# Patient Record
Sex: FEMALE | Race: WHITE | NOT HISPANIC OR LATINO | Employment: OTHER | RURAL
[De-identification: names, ages, dates, MRNs, and addresses within clinical notes are randomized per-mention and may not be internally consistent; named-entity substitution may affect disease eponyms.]

---

## 2017-06-07 ENCOUNTER — OFFICE VISIT (OUTPATIENT)
Dept: CARDIOLOGY | Facility: CLINIC | Age: 72
End: 2017-06-07

## 2017-06-07 VITALS
WEIGHT: 245 LBS | DIASTOLIC BLOOD PRESSURE: 88 MMHG | HEART RATE: 52 BPM | HEIGHT: 63 IN | BODY MASS INDEX: 43.41 KG/M2 | SYSTOLIC BLOOD PRESSURE: 130 MMHG

## 2017-06-07 DIAGNOSIS — I49.3 PVC (PREMATURE VENTRICULAR CONTRACTION): ICD-10-CM

## 2017-06-07 DIAGNOSIS — G89.29 CHRONIC BILATERAL LOW BACK PAIN WITHOUT SCIATICA: ICD-10-CM

## 2017-06-07 DIAGNOSIS — I10 ESSENTIAL HYPERTENSION: Primary | ICD-10-CM

## 2017-06-07 DIAGNOSIS — J45.20 MILD INTERMITTENT ASTHMA WITHOUT COMPLICATION: ICD-10-CM

## 2017-06-07 DIAGNOSIS — E78.49 OTHER HYPERLIPIDEMIA: ICD-10-CM

## 2017-06-07 DIAGNOSIS — M54.50 CHRONIC BILATERAL LOW BACK PAIN WITHOUT SCIATICA: ICD-10-CM

## 2017-06-07 PROCEDURE — 93000 ELECTROCARDIOGRAM COMPLETE: CPT | Performed by: NURSE PRACTITIONER

## 2017-06-07 PROCEDURE — 99213 OFFICE O/P EST LOW 20 MIN: CPT | Performed by: NURSE PRACTITIONER

## 2017-06-07 RX ORDER — SIMVASTATIN 10 MG
10 TABLET ORAL NIGHTLY
COMMUNITY
End: 2018-12-12 | Stop reason: SDUPTHER

## 2017-06-07 RX ORDER — OXYCODONE HYDROCHLORIDE 30 MG/1
30 TABLET, FILM COATED, EXTENDED RELEASE ORAL 2 TIMES DAILY
COMMUNITY

## 2017-06-07 RX ORDER — HYDRALAZINE HYDROCHLORIDE 10 MG/1
10 TABLET, FILM COATED ORAL DAILY
COMMUNITY
End: 2017-12-06 | Stop reason: SDUPTHER

## 2017-06-07 RX ORDER — DILTIAZEM HYDROCHLORIDE 120 MG/1
120 CAPSULE, EXTENDED RELEASE ORAL DAILY
COMMUNITY
End: 2018-12-12 | Stop reason: SDUPTHER

## 2017-06-07 RX ORDER — CHLORTHALIDONE 25 MG/1
25 TABLET ORAL DAILY
COMMUNITY
End: 2018-06-20 | Stop reason: ALTCHOICE

## 2017-06-07 RX ORDER — HYDROCHLOROTHIAZIDE 25 MG/1
25 TABLET ORAL DAILY
COMMUNITY
End: 2017-06-07 | Stop reason: HOSPADM

## 2017-06-07 NOTE — PROGRESS NOTES
Chief Complaint   Patient presents with   • Follow-up     She states B/P is only elevated with pain. Is to have labs today at the hospital. PCP writes refills on medication.   • Shortness of Breath     Relates to Asthma.       Cardiac Complaints  none      Subjective   Krista Mann is a 72 y.o. female with a history of HTN, hypercholesteremia, and PVCs, for which she takes tambocor for. Most recent stress in 2014 was negative for ischemia and showed good LV function. She returns today for follow up reporting some issues with hypertension. At last visit, she stated her blood pressure had been very high at home, almost 220 systolic, from her back pain.  Her cardizem dose was lowered last visit because of bradycardia and she was stared on chlorthalidone and hydralazine and HCTZ was stopped. Echo was advised at last visit to rule out any LVH, pulmonary HTN, or valvular concerns.  Echo showed a good LV function, no WMA, and no valvular concerns. She returns today for follow up and does state some shortness of breath if she over does it but is unsure if this is related to her asthma.  She does deny any palpitations or chest pain. Her main concern is in regard to stress in her life from caring for her  who is currently battling dementia. She states that her labs are monitored with PCP and will have blood work today.  No refills are needed today as they are done with PCP.  There is some confusion with her medication as she is on both HCTZ and chlorthalidone.    Cardiac History  Past Surgical History:   Procedure Laterality Date   • CARDIOVASCULAR STRESS TEST  07/17/2014    L. Myoview- negative for ischemia, PVCs noted, Tambocor increased 100 bid    • CONVERTED (HISTORICAL) HOLTER  08/18/2014    Holter- baseline NSR, ave 76 bpm, 16% PVCs, no V-tach, no SVT, no pauses, Cardizem added    • ECHO - CONVERTED  07/10/2014     Echo- E F60-65%, RVSP 30 mmHg, mild MR    • ECHO - CONVERTED  12/06/2016    EF 60%, mild MR, RSVP  "24       Current Outpatient Prescriptions   Medication Sig Dispense Refill   • chlorthalidone (HYGROTON) 25 MG tablet Take 25 mg by mouth Daily.     • diltiaZEM (TIAZAC) 120 MG 24 hr capsule Take 120 mg by mouth Daily.     • flecainide (TAMBOCOR) 100 MG tablet Take 100 mg by mouth 2 (Two) Times a Day.     • hydrALAZINE (APRESOLINE) 10 MG tablet Take 10 mg by mouth Daily.     • moexipril-hydrochlorothiazide (UNIRETIC) 15-25 MG per tablet Take 1 tablet by mouth Daily.     • OxyCODONE HCl ER (oxyCONTIN) 30 MG tablet extended-release 12 hour Take 30 mg by mouth 3 (Three) Times a Day.     • promethazine (PHENERGAN) 12.5 MG tablet Take 12.5 mg by mouth As Needed for nausea or vomiting.     • rOPINIRole (REQUIP) 3 MG tablet Take 3 mg by mouth Every Night.     • simvastatin (ZOCOR) 10 MG tablet Take 10 mg by mouth Every Night.       No current facility-administered medications for this visit.        Other and Sulfa antibiotics    Past Medical History:   Diagnosis Date   • Asthma    • Chronic back pain     followed by dr. 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 s/p surgery per Dr. Bailon and Dr. Zuluaga. Had colonstomy for four months.   • H/O total knee replacement      right knee   • H/O: hysterectomy     partial   • History of blood transfusion      several   • History of D&C    • History of nasal surgery     right side CA   • Hyperlipidemia    • Hypertension    • Mass     Also in May 2013 dx with large softball mass in stomach- non CA.   • Nerves      \"burnt\" in back at pain clinic and injections.   • RLS (restless legs syndrome)        Social History     Social History   • Marital status:      Spouse name: N/A   • Number of children: N/A   • Years of education: N/A     Occupational History   • Not on file.     Social History Main Topics   • Smoking status: Former " "Smoker     Quit date: 2000   • Smokeless tobacco: Never Used      Comment: Quit in 1998- started at age 25.    • Alcohol use No   • Drug use: No   • Sexual activity: Not on file     Other Topics Concern   • Not on file     Social History Narrative       Family History   Problem Relation Age of Onset   • Heart attack Father        Review of Systems   Constitutional: Negative.    HENT: Negative.    Respiratory: Positive for shortness of breath.    Cardiovascular: Negative.    Gastrointestinal: Negative.    Endocrine: Negative.    Skin: Negative.    Neurological: Negative.    Psychiatric/Behavioral: Negative.        DiabetesNo  Thyroidnormal    Objective     /88  Pulse 52  Ht 63\" (160 cm)  Wt 245 lb (111 kg)  BMI 43.4 kg/m2    Physical Exam   Constitutional: She is oriented to person, place, and time. She appears well-developed and well-nourished.   HENT:   Head: Normocephalic and atraumatic.   Eyes: EOM are normal. Pupils are equal, round, and reactive to light.   Neck: Normal range of motion. Neck supple.   Cardiovascular: Normal rate.  A regularly irregular rhythm present.   Murmur heard.  Pulmonary/Chest: Effort normal and breath sounds normal.   Abdominal: Soft.   Musculoskeletal: Normal range of motion.   Walking with a walker   Neurological: She is alert and oriented to person, place, and time.   Skin: Skin is warm and dry.         ECG 12 Lead  Date/Time: 6/7/2017 11:19 AM  Performed by: SADI PETERSEN  Authorized by: SADI PETERSEN   Rhythm: sinus rhythm  Ectopy: frequent PVCs  BPM: 77              Assessment/Plan     HR and BP are both stable today.  EKG done today for PVC and tambocor therapy shows a NSR with trigeminal PVC, she is completely asymptomatic and does not feel the palpitations.  She is on a great deal of diuretic therapy and not sure how she ended up on both HCTZ and chlorthalidone therapy.  She will be advised to d/c the HCTZ today and return next week for EKG. Labs will be checked " today by you, patient advised her potassium may be running low from the diuretic therapy she is on, she was encouraged she may need replacement.  No refills are needed at present.  No new cardiac workup advised as no new concerns voiced, most recent echo went over with patient.  Good cardiac diet and activity as tolerated advised. 6 month follow up advised or sooner if needed.  We will have her return in one week for EKG to reassess PVC.      Problems Addressed this Visit        Cardiovascular and Mediastinum    HTN (hypertension) - Primary    Relevant Medications    diltiaZEM (TIAZAC) 120 MG 24 hr capsule    chlorthalidone (HYGROTON) 25 MG tablet    hydrALAZINE (APRESOLINE) 10 MG tablet    Hyperlipemia    Relevant Medications    simvastatin (ZOCOR) 10 MG tablet       Respiratory    Asthma       Nervous and Auditory    Chronic back pain      Other Visit Diagnoses     PVC (premature ventricular contraction)        Relevant Medications    diltiaZEM (TIAZAC) 120 MG 24 hr capsule    Other Relevant Orders    ECG 12 Lead                  Electronically signed by ADAN Escobar June 7, 2017 3:09 PM

## 2017-06-14 ENCOUNTER — CLINICAL SUPPORT (OUTPATIENT)
Dept: CARDIOLOGY | Facility: CLINIC | Age: 72
End: 2017-06-14

## 2017-06-14 DIAGNOSIS — I49.3 PVC (PREMATURE VENTRICULAR CONTRACTION): Primary | ICD-10-CM

## 2017-06-14 PROCEDURE — 93000 ELECTROCARDIOGRAM COMPLETE: CPT | Performed by: INTERNAL MEDICINE

## 2017-06-14 NOTE — PROGRESS NOTES
Procedure     ECG 12 Lead  Date/Time: 6/14/2017 10:03 AM  Performed by: MELVA GILLESPIE  Authorized by: MELVA GILLESPIE   Comparison: compared with previous ECG from 6/7/2017  Comparison to previous ECG: No PVC's  Rhythm: sinus rhythm  Rate: normal  QRS axis: normal  Clinical impression: normal ECG

## 2017-12-06 ENCOUNTER — OFFICE VISIT (OUTPATIENT)
Dept: CARDIOLOGY | Facility: CLINIC | Age: 72
End: 2017-12-06

## 2017-12-06 VITALS
HEIGHT: 63 IN | DIASTOLIC BLOOD PRESSURE: 82 MMHG | BODY MASS INDEX: 39.34 KG/M2 | WEIGHT: 222 LBS | SYSTOLIC BLOOD PRESSURE: 150 MMHG | HEART RATE: 76 BPM

## 2017-12-06 DIAGNOSIS — I10 ESSENTIAL HYPERTENSION: ICD-10-CM

## 2017-12-06 DIAGNOSIS — E78.49 OTHER HYPERLIPIDEMIA: ICD-10-CM

## 2017-12-06 DIAGNOSIS — I49.3 PVC (PREMATURE VENTRICULAR CONTRACTION): Primary | ICD-10-CM

## 2017-12-06 DIAGNOSIS — R06.02 SHORTNESS OF BREATH: ICD-10-CM

## 2017-12-06 DIAGNOSIS — G89.29 CHRONIC BILATERAL LOW BACK PAIN WITHOUT SCIATICA: ICD-10-CM

## 2017-12-06 DIAGNOSIS — M54.50 CHRONIC BILATERAL LOW BACK PAIN WITHOUT SCIATICA: ICD-10-CM

## 2017-12-06 PROCEDURE — 99214 OFFICE O/P EST MOD 30 MIN: CPT | Performed by: NURSE PRACTITIONER

## 2017-12-06 PROCEDURE — 93000 ELECTROCARDIOGRAM COMPLETE: CPT | Performed by: NURSE PRACTITIONER

## 2017-12-06 RX ORDER — HYDRALAZINE HYDROCHLORIDE 10 MG/1
TABLET, FILM COATED ORAL
Qty: 60 TABLET | Refills: 8 | Status: SHIPPED | OUTPATIENT
Start: 2017-12-06 | End: 2018-06-20 | Stop reason: SDUPTHER

## 2017-12-06 NOTE — PROGRESS NOTES
Chief Complaint   Patient presents with   • Follow-up     For cardiac management. PCP writes refills on medication, reports last labs 3 months ago per PCP. She reports with pain B/P has been elevated. Patient verbalized current medication.   • Shortness of Breath     Relates to Asthma.       Cardiac Complaints  none      Subjective   Krista Mann is a 72 y.o. female with HTN, hypercholesteremia, and PVCs, for which she takes tambocor. Most recent stress in 2014 was negative for ischemia and showed good LV function. She returns today for follow up reporting some issues with hypertension. At last visit, she stated her blood pressure had been very high at home, almost 220 systolic, from her back pain.  Her cardizem dose was lowered last visit because of bradycardia and she was stared on chlorthalidone, hydralazine, and HCTZ was stopped. Echo was advised at last visit to rule out any LVH, pulmonary HTN, or valvular concerns.  Echo showed good LV function, no WMA, and no valvular concerns. At last visit, she reported use of both HCTZ and clorthalidone therapy.  She was advised to check medication regimen with PCP.  EKG done at that visit also showed frequent PVCs and repeat EKG was scheduled for the next week after stopping HCTZ and no PVCs were reported.  She returns today for follow up and denies any new cardiac concerns.  She does have some shortness of breath but attributes to her asthma.  She denies any chest pain or palpitations.  Labs he reports with PCP, she says most recent were done about 3 months ago, no copy available.  No refills needed as she report with PCP.  She continues to have elevated blood pressure when she is hurting but states most often well controlled.        Cardiac History  Past Surgical History:   Procedure Laterality Date   • CARDIOVASCULAR STRESS TEST  07/17/2014    L. Myoview- negative for ischemia, PVCs noted, Tambocor increased 100 bid    • CONVERTED (HISTORICAL) HOLTER  08/18/2014     "Holter- baseline NSR, ave 76 bpm, 16% PVCs, no V-tach, no SVT, no pauses, Cardizem added    • ECHO - CONVERTED  07/10/2014     Echo- E F60-65%, RVSP 30 mmHg, mild MR    • ECHO - CONVERTED  12/06/2016    EF 60%, mild MR, RSVP 24       Current Outpatient Prescriptions   Medication Sig Dispense Refill   • chlorthalidone (HYGROTON) 25 MG tablet Take 25 mg by mouth Daily.     • diltiaZEM (TIAZAC) 120 MG 24 hr capsule Take 120 mg by mouth Daily.     • flecainide (TAMBOCOR) 100 MG tablet Take 100 mg by mouth 2 (Two) Times a Day.     • hydrALAZINE (APRESOLINE) 10 MG tablet 1 tablet in AM and 1/2 tablet PM 60 tablet 8   • moexipril-hydrochlorothiazide (UNIRETIC) 15-25 MG per tablet Take 1 tablet by mouth Daily.     • OxyCODONE HCl ER (oxyCONTIN) 30 MG tablet extended-release 12 hour Take 30 mg by mouth 3 (Three) Times a Day.     • promethazine (PHENERGAN) 12.5 MG tablet Take 12.5 mg by mouth As Needed for nausea or vomiting.     • rOPINIRole (REQUIP) 3 MG tablet Take 3 mg by mouth Every Night.     • simvastatin (ZOCOR) 10 MG tablet Take 10 mg by mouth Every Night.       No current facility-administered medications for this visit.        Other and Sulfa antibiotics    Past Medical History:   Diagnosis Date   • Asthma    • Chronic back pain     followed by dr. 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 s/p surgery per Dr. Bailon and Dr. Zuluaga. Had colonstomy for four months.   • H/O total knee replacement      right knee   • H/O: hysterectomy     partial   • History of blood transfusion      several   • History of D&C    • History of nasal surgery     right side CA   • Hyperlipidemia    • Hypertension    • Mass     Also in May 2013 dx with large softball mass in stomach- non CA.   • Nerves      \"burnt\" in back at pain clinic and injections.   • RLS (restless legs syndrome)  " "      Social History     Social History   • Marital status:      Spouse name: N/A   • Number of children: N/A   • Years of education: N/A     Occupational History   • Not on file.     Social History Main Topics   • Smoking status: Former Smoker     Quit date: 2000   • Smokeless tobacco: Never Used      Comment: Quit in 1998- started at age 25.    • Alcohol use No   • Drug use: No   • Sexual activity: Not on file     Other Topics Concern   • Not on file     Social History Narrative       Family History   Problem Relation Age of Onset   • Heart attack Father        Review of Systems   Constitution: Negative for weakness and malaise/fatigue.   Cardiovascular: Negative for chest pain, dyspnea on exertion, near-syncope and palpitations.   Respiratory: Positive for shortness of breath. Negative for wheezing.    Musculoskeletal: Positive for back pain and joint pain.   Gastrointestinal: Negative for anorexia, heartburn and nausea.   Genitourinary: Negative for dysuria, hesitancy and nocturia.   Neurological: Negative for dizziness, light-headedness and loss of balance.   Psychiatric/Behavioral: Negative for altered mental status and depression.       DiabetesNo  Thyroidnormal    Objective     /82 (BP Location: Right arm)  Pulse 76  Ht 160 cm (62.99\")  Wt 101 kg (222 lb)  BMI 39.34 kg/m2    Physical Exam   Constitutional: She is oriented to person, place, and time. She appears well-developed and well-nourished.   HENT:   Head: Normocephalic and atraumatic.   Eyes: EOM are normal. Pupils are equal, round, and reactive to light.   Neck: Normal range of motion. Neck supple.   Cardiovascular: Normal rate and regular rhythm.    Murmur heard.  Pulmonary/Chest: Effort normal and breath sounds normal.   Abdominal: Soft.   Musculoskeletal: Normal range of motion.   Walking with assist from walker   Neurological: She is alert and oriented to person, place, and time.   Skin: Skin is warm and dry.   Psychiatric: She " has a normal mood and affect. Her behavior is normal.         ECG 12 Lead  Date/Time: 12/6/2017 12:17 PM  Performed by: SADI PETERSEN  Authorized by: SADI PETERSEN   Rhythm: sinus rhythm  Clinical impression: normal ECG            Assessment/Plan     HR is stable today.  EKG done today for PVCs and tambocor therapy shows NSR with normal QT.  No changes to tambocor regimen will be advised.  BP is slightly elevated at 150/80, but she admits to a great deal of back pain today.  I advised patient to add an extra 5mg dose of apresoline at night for better blood pressure control with limited sodium intake adivsed.  Patient encouraged she may add an extra half of apresoline if needed at night.  No new cardiac workup advised today as no new concerns are voiced.  Labs are done with your office, could we get next copy for our records?  No refills needed as they are filled with your office.  6 month follow up scheduled or sooner if needed.        Problems Addressed this Visit        Cardiovascular and Mediastinum    HTN (hypertension)    Relevant Medications    hydrALAZINE (APRESOLINE) 10 MG tablet    Hyperlipemia      Other Visit Diagnoses     PVC (premature ventricular contraction)    -  Primary    Relevant Orders    ECG 12 Lead    Chronic bilateral low back pain without sciatica                      Electronically signed by ADAN Escobar December 6, 2017 5:14 PM

## 2018-06-01 NOTE — PROGRESS NOTES
Chief Complaint   Patient presents with   • Follow-up     For cardiac management. Patient is not on aspirin. Reports she is to have lab work done soon per PCP. Reports she does have shortness of breath, but relates to asthma.    • Med Refill     Needs refills on cardiac medications. 30 day supplys to [facility].       Cardiac Complaints  chest pressure/discomfort      Subjective   Krista Mann is a 73 y.o. female with HTN, hypercholesteremia, and PVCs, for which she takes tambocor. Most recent stress in 2014 was negative for ischemia and showed good LV function. She returns today for follow up reporting some issues with hypertension. At last visit, she stated her blood pressure had been very high at home, almost 220 systolic, from her back pain.  Her cardizem dose was lowered last visit because of bradycardia and she was stared on chlorthalidone, hydralazine, and HCTZ was stopped. Echo was advised at last visit to rule out any LVH, pulmonary HTN, or valvular concerns.  Echo showed good LV function, no WMA, and no valvular concerns. At last visit, she reported use of both HCTZ and clorthalidone therapy.  She was advised to check medication regimen with PCP.  EKG done at that visit also showed frequent PVCs and repeat EKG was scheduled for the next week after stopping HCTZ and no PVCs were reported. Apresoline increased for better blood pressure control after last visit.  She returns today for follow up and denies any new cardiac concerns.  She does have shortness of breath but admits it is no worse than prior, she admits to her asthma.  She does continue to have back pain and has some elevated blood pressure but thinks it pain related and for the most part it has been well controlled with apresoline increase.  Refills of apresoline requested.  Her main concern is from anxiety from dealing from her  and his dementia.          Cardiac History  Past Surgical History:   Procedure Laterality Date    • CARDIOVASCULAR STRESS TEST  07/17/2014    L. Myoview- negative for ischemia, PVCs noted, Tambocor increased 100 bid    • CONVERTED (HISTORICAL) HOLTER  08/18/2014    Holter- baseline NSR, ave 76 bpm, 16% PVCs, no V-tach, no SVT, no pauses, Cardizem added    • ECHO - CONVERTED  07/10/2014     Echo- E F60-65%, RVSP 30 mmHg, mild MR    • ECHO - CONVERTED  12/06/2016    EF 60%, mild MR, RSVP 24       Current Outpatient Prescriptions   Medication Sig Dispense Refill   • diltiaZEM (TIAZAC) 120 MG 24 hr capsule Take 120 mg by mouth Daily.     • flecainide (TAMBOCOR) 100 MG tablet Take 100 mg by mouth 2 (Two) Times a Day.     • gabapentin (NEURONTIN) 800 MG tablet Take 800 mg by mouth 3 (Three) Times a Day.     • hydrALAZINE (APRESOLINE) 10 MG tablet 1 tablet in AM and 1/2 tablet PM 60 tablet 11   • moexipril-hydrochlorothiazide (UNIRETIC) 15-25 MG per tablet Take 1 tablet by mouth Daily.     • OxyCODONE HCl ER (oxyCONTIN) 30 MG tablet extended-release 12 hour Take 30 mg by mouth 3 (Three) Times a Day.     • promethazine (PHENERGAN) 12.5 MG tablet Take 12.5 mg by mouth As Needed for nausea or vomiting.     • rOPINIRole (REQUIP) 3 MG tablet Take 3 mg by mouth Every Night.     • simvastatin (ZOCOR) 10 MG tablet Take 10 mg by mouth Every Night.       No current facility-administered medications for this visit.        Other and Sulfa antibiotics    Past Medical History:   Diagnosis Date   • Asthma    • Chronic back pain     followed by dr. 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 s/p surgery per Dr. Bailon and Dr. Zuluaga. Had colonstomy for four months.   • H/O total knee replacement      right knee   • H/O: hysterectomy     partial   • History of blood transfusion      several   • History of D&C    • History of nasal surgery     right side CA   • Hyperlipidemia    •  "Hypertension    • Mass     Also in May 2013 dx with large softball mass in stomach- non CA.   • Nerves      \"burnt\" in back at pain clinic and injections.   • RLS (restless legs syndrome)        Social History     Social History   • Marital status:      Spouse name: N/A   • Number of children: N/A   • Years of education: N/A     Occupational History   • Not on file.     Social History Main Topics   • Smoking status: Former Smoker     Quit date: 2000   • Smokeless tobacco: Never Used      Comment: Quit in 1998- started at age 25.    • Alcohol use No   • Drug use: No   • Sexual activity: Not on file     Other Topics Concern   • Not on file     Social History Narrative   • No narrative on file       Family History   Problem Relation Age of Onset   • Heart attack Father        Review of Systems   Constitution: Negative for weakness and malaise/fatigue.   Cardiovascular: Positive for dyspnea on exertion. Negative for chest pain, irregular heartbeat, leg swelling, orthopnea, palpitations and syncope.   Respiratory: Positive for shortness of breath. Negative for cough and wheezing.    Musculoskeletal: Positive for back pain and joint pain.   Gastrointestinal: Negative for anorexia, heartburn and nausea.   Genitourinary: Negative for dysuria, hematuria, hesitancy and nocturia.   Neurological: Negative for dizziness, light-headedness and loss of balance.   Psychiatric/Behavioral: Negative for depression and memory loss. The patient is not nervous/anxious.            Objective     /90 (BP Location: Left arm)   Pulse 71   Ht 160 cm (62.99\")   Wt 108 kg (238 lb)   BMI 42.17 kg/m²     Physical Exam   Constitutional: She is oriented to person, place, and time. She appears well-developed and well-nourished.   HENT:   Head: Normocephalic and atraumatic.   Eyes: EOM are normal. Pupils are equal, round, and reactive to light.   Neck: Normal range of motion. Neck supple.   Cardiovascular: Normal rate and regular " rhythm.    Murmur heard.  Pulmonary/Chest: Effort normal and breath sounds normal.   Abdominal: Soft.   Musculoskeletal: Normal range of motion.   Neurological: She is alert and oriented to person, place, and time.   Skin: Skin is warm and dry.   Psychiatric: She has a normal mood and affect. Her behavior is normal.         ECG 12 Lead  Date/Time: 6/20/2018 12:05 PM  Performed by: SADI PETERSEN  Authorized by: SADI PETERSEN   Rhythm: sinus rhythm  BPM: 71  Clinical impression: normal ECG            Assessment/Plan     HR is stable today.  BP is elevated at 150/90, she thinks it is from back pain.  Patient encouraged if needed to take an extra half of apresoline as needed for better blood pressure control.  EKG done today for PVC and tambocor therapy shows a NSR with normal QT, current advised.  For lipid management, she continues on statin therapy and has tolerated well.  Labs with your office, could we have next for our review?  Refills of apresoline sent per request. We discussed her stress in detail from caring for her demented  and she does report getting respite care.  Weight is up quite a bit from last visit but she admits to late night snacking as she does not sleep.  We discussed limiting caloric intake, carbs, and starches as BMI high at 42.  She does continue to follow with pain management in regards to back pain. Refills of cardiac meds sent per request.  6 month follow up advised or sooner if needed.        Problems Addressed this Visit        Cardiovascular and Mediastinum    HTN (hypertension)    Relevant Medications    hydrALAZINE (APRESOLINE) 10 MG tablet    Hyperlipemia       Respiratory    Asthma       Nervous and Auditory    Chronic back pain      Other Visit Diagnoses     PVC (premature ventricular contraction)    -  Primary    Relevant Orders    ECG 12 Lead    Shortness of breath        Class 3 obesity due to excess calories with serious comorbidity and body mass index (BMI) of 40.0  to 44.9 in adult              Patient's Body mass index is 42.17 kg/m². BMI is above normal parameters. Recommendations include: nutrition counseling.          Electronically signed by ADAN Escobar June 20, 2018 4:48 PM

## 2018-06-20 ENCOUNTER — OFFICE VISIT (OUTPATIENT)
Dept: CARDIOLOGY | Facility: CLINIC | Age: 73
End: 2018-06-20

## 2018-06-20 VITALS
BODY MASS INDEX: 42.17 KG/M2 | SYSTOLIC BLOOD PRESSURE: 150 MMHG | HEIGHT: 63 IN | DIASTOLIC BLOOD PRESSURE: 90 MMHG | WEIGHT: 238 LBS | HEART RATE: 71 BPM

## 2018-06-20 DIAGNOSIS — J45.20 MILD INTERMITTENT ASTHMA WITHOUT COMPLICATION: ICD-10-CM

## 2018-06-20 DIAGNOSIS — G89.29 CHRONIC BILATERAL LOW BACK PAIN WITHOUT SCIATICA: ICD-10-CM

## 2018-06-20 DIAGNOSIS — I49.3 PVC (PREMATURE VENTRICULAR CONTRACTION): Primary | ICD-10-CM

## 2018-06-20 DIAGNOSIS — R06.02 SHORTNESS OF BREATH: ICD-10-CM

## 2018-06-20 DIAGNOSIS — IMO0001 CLASS 3 OBESITY DUE TO EXCESS CALORIES WITH SERIOUS COMORBIDITY AND BODY MASS INDEX (BMI) OF 40.0 TO 44.9 IN ADULT: ICD-10-CM

## 2018-06-20 DIAGNOSIS — I10 ESSENTIAL HYPERTENSION: ICD-10-CM

## 2018-06-20 DIAGNOSIS — M54.50 CHRONIC BILATERAL LOW BACK PAIN WITHOUT SCIATICA: ICD-10-CM

## 2018-06-20 DIAGNOSIS — E78.00 PURE HYPERCHOLESTEROLEMIA: ICD-10-CM

## 2018-06-20 PROCEDURE — 99214 OFFICE O/P EST MOD 30 MIN: CPT | Performed by: NURSE PRACTITIONER

## 2018-06-20 PROCEDURE — 93000 ELECTROCARDIOGRAM COMPLETE: CPT | Performed by: NURSE PRACTITIONER

## 2018-06-20 RX ORDER — GABAPENTIN 800 MG/1
800 TABLET ORAL 3 TIMES DAILY
COMMUNITY
End: 2019-06-19 | Stop reason: ALTCHOICE

## 2018-06-20 RX ORDER — HYDRALAZINE HYDROCHLORIDE 10 MG/1
TABLET, FILM COATED ORAL
Qty: 60 TABLET | Refills: 11 | Status: SHIPPED | OUTPATIENT
Start: 2018-06-20 | End: 2018-12-12 | Stop reason: SDUPTHER

## 2018-12-07 NOTE — PROGRESS NOTES
No chief complaint on file.      Cardiac Complaints  {NT symptoms.:65580}      Subjective   Krista Mann is a 73 y.o. female with HTN, hypercholesteremia, and PVCs, for which she takes tambocor. Most recent stress in 2014 was negative for ischemia and showed good LV function. She returns today for follow up reporting some issues with hypertension. At last visit, she stated her blood pressure had been very high at home, almost 220 systolic, from her back pain.  Her cardizem dose was lowered last visit because of bradycardia and she was stared on chlorthalidone, hydralazine, and HCTZ was stopped. Echo was advised at last visit to rule out any LVH, pulmonary HTN, or valvular concerns.  Echo showed good LV function, no WMA, and no valvular concerns. At last visit, she reported use of both HCTZ and clorthalidone therapy. She was advised to check medication regimen with PCP.  EKG done at that visit also showed frequent PVCs and repeat EKG was scheduled for the next week after stopping HCTZ and no PVCs were reported. In 2017, apresoline increased for better blood pressure control after last visit.                  Cardiac History  Past Surgical History:   Procedure Laterality Date   • CARDIOVASCULAR STRESS TEST  07/17/2014    L. Myoview- negative for ischemia, PVCs noted, Tambocor increased 100 bid    • CONVERTED (HISTORICAL) HOLTER  08/18/2014    Holter- baseline NSR, ave 76 bpm, 16% PVCs, no V-tach, no SVT, no pauses, Cardizem added    • ECHO - CONVERTED  07/10/2014     Echo- E F60-65%, RVSP 30 mmHg, mild MR    • ECHO - CONVERTED  12/06/2016    EF 60%, mild MR, RSVP 24       Current Outpatient Medications   Medication Sig Dispense Refill   • diltiaZEM (TIAZAC) 120 MG 24 hr capsule Take 120 mg by mouth Daily.     • flecainide (TAMBOCOR) 100 MG tablet Take 100 mg by mouth 2 (Two) Times a Day.     • gabapentin (NEURONTIN) 800 MG tablet Take 800 mg by mouth 3 (Three) Times a Day.     • hydrALAZINE (APRESOLINE) 10 MG tablet  "1 tablet in AM and 1/2 tablet PM 60 tablet 11   • moexipril-hydrochlorothiazide (UNIRETIC) 15-25 MG per tablet Take 1 tablet by mouth Daily.     • OxyCODONE HCl ER (oxyCONTIN) 30 MG tablet extended-release 12 hour Take 30 mg by mouth 3 (Three) Times a Day.     • promethazine (PHENERGAN) 12.5 MG tablet Take 12.5 mg by mouth As Needed for nausea or vomiting.     • rOPINIRole (REQUIP) 3 MG tablet Take 3 mg by mouth Every Night.     • simvastatin (ZOCOR) 10 MG tablet Take 10 mg by mouth Every Night.       No current facility-administered medications for this visit.        Other and Sulfa antibiotics    Past Medical History:   Diagnosis Date   • Asthma    • Chronic back pain     followed by dr. 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 s/p surgery per Dr. Bailon and Dr. Zuluaga. Had colonstomy for four months.   • H/O total knee replacement      right knee   • H/O: hysterectomy     partial   • History of blood transfusion      several   • History of D&C    • History of nasal surgery     right side CA   • Hyperlipidemia    • Hypertension    • Mass     Also in May 2013 dx with large softball mass in stomach- non CA.   • Nerves      \"burnt\" in back at pain clinic and injections.   • RLS (restless legs syndrome)        Social History     Socioeconomic History   • Marital status:      Spouse name: Not on file   • Number of children: Not on file   • Years of education: Not on file   • Highest education level: Not on file   Social Needs   • Financial resource strain: Not on file   • Food insecurity - worry: Not on file   • Food insecurity - inability: Not on file   • Transportation needs - medical: Not on file   • Transportation needs - non-medical: Not on file   Occupational History   • Not on file   Tobacco Use   • Smoking status: Former Smoker     Last attempt to quit: 2000 "     Years since quittin.9   • Smokeless tobacco: Never Used   • Tobacco comment: Quit in - started at age 25.    Substance and Sexual Activity   • Alcohol use: No   • Drug use: No   • Sexual activity: Not on file   Other Topics Concern   • Not on file   Social History Narrative   • Not on file       Family History   Problem Relation Age of Onset   • Heart attack Father        ROS        Objective     There were no vitals taken for this visit.    Physical Exam    Procedures    Assessment/Plan                     {Assess/Plan SmartLinks (Optional):18701}    Patient's There is no height or weight on file to calculate BMI. BMI is {BMI range:35626}.      I advised Krista of the risks of continuing to use tobacco, and I provided her with tobacco cessation educational materials in the After Visit Summary.     During this visit, I spent *** minutes counseling the patient regarding tobacco cessation.            Electronically signed by ADAN Escobar 2018 10:07 AM

## 2018-12-12 ENCOUNTER — OFFICE VISIT (OUTPATIENT)
Dept: CARDIOLOGY | Facility: CLINIC | Age: 73
End: 2018-12-12

## 2018-12-12 VITALS
HEART RATE: 66 BPM | HEIGHT: 63 IN | WEIGHT: 208 LBS | BODY MASS INDEX: 36.86 KG/M2 | DIASTOLIC BLOOD PRESSURE: 90 MMHG | SYSTOLIC BLOOD PRESSURE: 140 MMHG

## 2018-12-12 DIAGNOSIS — J45.20 MILD INTERMITTENT ASTHMA WITHOUT COMPLICATION: ICD-10-CM

## 2018-12-12 DIAGNOSIS — I49.3 PVC (PREMATURE VENTRICULAR CONTRACTION): ICD-10-CM

## 2018-12-12 DIAGNOSIS — Z79.899 ENCOUNTER FOR MONITORING ANTI-ARRHYTHMIC THERAPY: ICD-10-CM

## 2018-12-12 DIAGNOSIS — R00.2 PALPITATIONS: ICD-10-CM

## 2018-12-12 DIAGNOSIS — Z51.81 ENCOUNTER FOR MONITORING ANTI-ARRHYTHMIC THERAPY: ICD-10-CM

## 2018-12-12 DIAGNOSIS — E78.00 PURE HYPERCHOLESTEROLEMIA: ICD-10-CM

## 2018-12-12 DIAGNOSIS — I10 ESSENTIAL HYPERTENSION: Primary | ICD-10-CM

## 2018-12-12 PROCEDURE — 93000 ELECTROCARDIOGRAM COMPLETE: CPT | Performed by: NURSE PRACTITIONER

## 2018-12-12 PROCEDURE — 99214 OFFICE O/P EST MOD 30 MIN: CPT | Performed by: NURSE PRACTITIONER

## 2018-12-12 RX ORDER — MOEXIPRIL HYDROCHLORIDE AND HYDROCHLOROTHIAZIDE 15; 25 MG/1; MG/1
1 TABLET, FILM COATED ORAL DAILY
Qty: 90 TABLET | Refills: 2 | Status: SHIPPED | OUTPATIENT
Start: 2018-12-12

## 2018-12-12 RX ORDER — FLECAINIDE ACETATE 100 MG/1
100 TABLET ORAL 2 TIMES DAILY
Qty: 180 TABLET | Refills: 2 | Status: SHIPPED | OUTPATIENT
Start: 2018-12-12

## 2018-12-12 RX ORDER — HYDRALAZINE HYDROCHLORIDE 10 MG/1
TABLET, FILM COATED ORAL
Qty: 60 TABLET | Refills: 11 | Status: SHIPPED | OUTPATIENT
Start: 2018-12-12 | End: 2019-02-20 | Stop reason: DRUGHIGH

## 2018-12-12 RX ORDER — SIMVASTATIN 10 MG
10 TABLET ORAL NIGHTLY
Qty: 90 TABLET | Refills: 2 | Status: SHIPPED | OUTPATIENT
Start: 2018-12-12

## 2018-12-12 RX ORDER — DILTIAZEM HYDROCHLORIDE 120 MG/1
120 CAPSULE, EXTENDED RELEASE ORAL DAILY
Qty: 90 CAPSULE | Refills: 2 | Status: SHIPPED | OUTPATIENT
Start: 2018-12-12 | End: 2019-06-19 | Stop reason: DRUGHIGH

## 2018-12-12 NOTE — PROGRESS NOTES
Chief Complaint   Patient presents with   • Follow-up     For cardiac management. Patient is not on aspirin. Reports that she has asthma and occasionally has shortness. Last lab work was done at the beginning of this year per PCP, not in chart.    • Med Refill     PCP does medication refills. Brought medication list.        Subjective       Krista Mann is a 73 y.o. female with HTN, hypercholesteremia, and PVCs. She has been treated with flecainide which has been very effective. Most recent stress in 2014 was negative for ischemia with normal LVEF. Echo repeated in 2016 indicated stable EF and valvular function. Her blood pressure has been a little difficult to control and medication adjustments have been made. She came in today for follow up. She has no significant cardiac complaints. Palpitations are well controlled. She denies chest pain. Shortness of breath on exertion is present but stable. She has back pain and uses walker for ambulation which is difficult for her at times. Asthma is well controlled at present. Blood pressure at home is stable but occasionally elevated. No recent labs. Refills are requested. She is under a lot of stress at home with  who has dementia.     HPI         Cardiac History:    Past Surgical History:   Procedure Laterality Date   • CARDIOVASCULAR STRESS TEST  07/17/2014    L. Myoview- negative for ischemia, PVCs noted, Tambocor increased 100 bid    • CONVERTED (HISTORICAL) HOLTER  08/18/2014    Holter- baseline NSR, ave 76 bpm, 16% PVCs, no V-tach, no SVT, no pauses, Cardizem added    • ECHO - CONVERTED  07/10/2014     Echo- E F60-65%, RVSP 30 mmHg, mild MR    • ECHO - CONVERTED  12/06/2016    EF 60%, mild MR, RSVP 24       Current Outpatient Medications   Medication Sig Dispense Refill   • diltiaZEM (TIAZAC) 120 MG 24 hr capsule Take 1 capsule by mouth Daily. 90 capsule 2   • flecainide (TAMBOCOR) 100 MG tablet Take 1 tablet by mouth 2 (Two) Times a Day. 180 tablet 2   •  "gabapentin (NEURONTIN) 800 MG tablet Take 800 mg by mouth 3 (Three) Times a Day.     • hydrALAZINE (APRESOLINE) 10 MG tablet 1 tablet in AM and 1/2 tablet PM 60 tablet 11   • moexipril-hydrochlorothiazide (UNIRETIC) 15-25 MG per tablet Take 1 tablet by mouth Daily. 90 tablet 2   • OxyCODONE HCl ER (oxyCONTIN) 30 MG tablet extended-release 12 hour Take 30 mg by mouth 2 (Two) Times a Day.     • promethazine (PHENERGAN) 12.5 MG tablet Take 12.5 mg by mouth As Needed for nausea or vomiting.     • rOPINIRole (REQUIP) 3 MG tablet Take 3 mg by mouth Every Night.     • simvastatin (ZOCOR) 10 MG tablet Take 1 tablet by mouth Every Night. 90 tablet 2     No current facility-administered medications for this visit.        Other and Sulfa antibiotics    Past Medical History:   Diagnosis Date   • Asthma    • Chronic back pain     followed by dr. Land (dx with scoliosis, arthritis, narrowing of the spine, and lumbar facet syndrome)   • Fibromyalgia    • Fistula     Admitted to [facility] in May 2013,large intestine attached to bladder. s/p surgery per Dr. Bailon and Dr. Zuluaga. Had colonstomy for four months.   • H/O total knee replacement      right knee   • H/O: hysterectomy     partial   • History of blood transfusion      several   • History of D&C    • History of nasal surgery     right side CA   • Hyperlipidemia    • Hypertension    • Mass     Also in May 2013 dx with large softball mass in stomach- non CA.   • Nerves      \"burnt\" in back at pain clinic and injections.   • RLS (restless legs syndrome)        Social History     Socioeconomic History   • Marital status:      Spouse name: Not on file   • Number of children: Not on file   • Years of education: Not on file   • Highest education level: Not on file   Social Needs   • Financial resource strain: Not on file   • Food insecurity - worry: Not on file   • Food insecurity - inability: Not on file   • Transportation needs - medical: Not on file   • Transportation " "needs - non-medical: Not on file   Occupational History   • Not on file   Tobacco Use   • Smoking status: Former Smoker     Last attempt to quit: 2000     Years since quittin.9   • Smokeless tobacco: Never Used   • Tobacco comment: Quit in - started at age 25.    Substance and Sexual Activity   • Alcohol use: No   • Drug use: No   • Sexual activity: Not on file   Other Topics Concern   • Not on file   Social History Narrative   • Not on file       Family History   Problem Relation Age of Onset   • Heart attack Father        Review of Systems   Constitution: Positive for weight loss (per pt, weight today not accurate as she was leaning on walker). Negative for decreased appetite, weakness and malaise/fatigue.   HENT: Negative.    Eyes: Negative.    Cardiovascular: Positive for dyspnea on exertion and leg swelling (very mild ). Negative for chest pain, orthopnea, palpitations (well controlled now ) and syncope.   Respiratory: Positive for shortness of breath. Negative for cough.    Endocrine: Negative.    Hematologic/Lymphatic: Negative.    Skin: Negative.    Musculoskeletal: Positive for arthritis, back pain, joint pain and stiffness. Negative for falls.   Gastrointestinal: Negative for abdominal pain and melena.   Genitourinary: Negative for dysuria and hematuria.   Neurological: Negative for dizziness.   Psychiatric/Behavioral: Positive for depression (stress with  who has memory loss ). Negative for altered mental status.   Allergic/Immunologic: Negative.       Diabetes- No  Thyroid-normal    Objective     /90   Pulse 66   Ht 160 cm (62.99\")   Wt 94.3 kg (208 lb)   BMI 36.85 kg/m²     Physical Exam   Constitutional: She is oriented to person, place, and time. She appears well-developed and well-nourished. No distress.   HENT:   Head: Normocephalic and atraumatic.   Eyes: Pupils are equal, round, and reactive to light.   Neck: Normal range of motion.   Cardiovascular: Normal rate, regular " rhythm and intact distal pulses.   Murmur heard.  Pulmonary/Chest: Effort normal and breath sounds normal. No respiratory distress. She has no wheezes. She has no rales.   Abdominal: Soft. Bowel sounds are normal. She exhibits no distension.   Musculoskeletal: Normal range of motion. She exhibits edema (trace bilateral LE edema ).   Neurological: She is alert and oriented to person, place, and time.   Skin: Skin is warm and dry. She is not diaphoretic.   Psychiatric: She has a normal mood and affect.   Nursing note and vitals reviewed.       ECG 12 Lead  Date/Time: 12/12/2018 4:44 PM  Performed by: Alethea Cárdenas APRN  Authorized by: Alethea Cárdenas APRN   Comparison: compared with previous ECG from 6/20/2018  Similar to previous ECG  Rhythm: sinus rhythm  Rate: normal  BPM: 73  Conduction: 1st degree  Clinical impression: non-specific ECG  Comments:  ms  QTc 420 ms                  Assessment/Plan    Heart rate and blood pressure are stable. EKG for PVC on flecainide showed NSR at 66 bpm, ID interval 204 indicating first degree AV block, QTc 420 ms. No PVC seen. Continue flecainide. Continue diltiazem. Blood pressure is upper limit of normal but improved from last visit. She is advised to take whole dose of hydralazine at night if blood pressure increases to greater than 140/90. Limit sodium intake to 1,500 mg daily. She was given a lab order to check CBC, CMP, lipids, TSH. Copy will be forwarded to you. No there testing ordered today as she remains asymptomatic. Most recent echo from 2016 reviewed with her which showed normal EF. Refills sent for her cardiac meds. She continues to have chronic back pain which is followed by Dr. Evans. Weight loss would be beneficial as BMI is significantly elevated. She appears stable from a cardiac standpoint. We will see her back in six months or sooner if needed.    Krista was seen today for follow-up and med refill.    Diagnoses and all orders for this visit:    Essential  hypertension  -     Comprehensive Metabolic Panel; Future  -     CBC & Differential; Future  -     Lipid Panel; Future  -     Magnesium; Future  -     TSH; Future    Pure hypercholesterolemia  -     Comprehensive Metabolic Panel; Future  -     CBC & Differential; Future  -     Lipid Panel; Future  -     Magnesium; Future  -     TSH; Future    Mild intermittent asthma without complication  -     Comprehensive Metabolic Panel; Future  -     CBC & Differential; Future  -     Lipid Panel; Future  -     Magnesium; Future  -     TSH; Future    PVC (premature ventricular contraction)  -     Comprehensive Metabolic Panel; Future  -     CBC & Differential; Future  -     Lipid Panel; Future  -     Magnesium; Future  -     TSH; Future    Palpitations  -     Comprehensive Metabolic Panel; Future  -     CBC & Differential; Future  -     Lipid Panel; Future  -     Magnesium; Future  -     TSH; Future    Other orders  -     flecainide (TAMBOCOR) 100 MG tablet; Take 1 tablet by mouth 2 (Two) Times a Day.  -     moexipril-hydrochlorothiazide (UNIRETIC) 15-25 MG per tablet; Take 1 tablet by mouth Daily.  -     diltiaZEM (TIAZAC) 120 MG 24 hr capsule; Take 1 capsule by mouth Daily.  -     simvastatin (ZOCOR) 10 MG tablet; Take 1 tablet by mouth Every Night.  -     hydrALAZINE (APRESOLINE) 10 MG tablet; 1 tablet in AM and 1/2 tablet PM        Patient's Body mass index is 36.85 kg/m². BMI is above normal parameters. Recommendations include: nutrition counseling.                 Electronically signed by ADAN Canela,  December 12, 2018 4:40 PM

## 2019-02-19 ENCOUNTER — TELEPHONE (OUTPATIENT)
Dept: CARDIOLOGY | Facility: CLINIC | Age: 74
End: 2019-02-19

## 2019-02-19 NOTE — TELEPHONE ENCOUNTER
"[facility] nurse called left message that patient's B/P today at visit was 188/110, nurse reports that recheck B/P 180/90 and that patient's B/P had been fluctuating for the last 2 weeks low to high.     Phoned patient concerning B/P and medications, patient states \"PCP had increased Diltiazem 120mg to bid this month\", current medications as follows.    Diltiazem 120mg bid  Flecainide 100mg bid  Hydralazine 10mg one tablet in am and 1/2 tablet in pm, she has been taking one whole tablet in pm if B/P greater than 140/90  uniretic 15-25 mg once daily  zocor 10mg at HS    Patient reports [facility] was going to stop seeing her until they took her B/P today and they are going to continue for the next 2 months and she was happy. Patient has been in more pain due to neuropathy and had a lot of stress caring for her  with Alzheimers.  "

## 2019-02-20 RX ORDER — HYDRALAZINE HYDROCHLORIDE 25 MG/1
25 TABLET, FILM COATED ORAL 2 TIMES DAILY
Qty: 90 TABLET | Refills: 3 | Status: SHIPPED | OUTPATIENT
Start: 2019-02-20

## 2019-02-20 NOTE — TELEPHONE ENCOUNTER
Increase hydralazine to 25 mg TID. Start with 25 mg BID and monitor bp for 2-3 days. If remains elevated, increase to TID.

## 2019-02-20 NOTE — TELEPHONE ENCOUNTER
Patient made aware of recommendations, patient verbalized understanding. Phoned [facility] spoke with May, May made aware of recommendations to increase Hydralazine 25mg to bid monitor B/P for 2-3 days, if B/P remains elevated increase to TID, May verbalized understanding.

## 2019-06-19 ENCOUNTER — OFFICE VISIT (OUTPATIENT)
Dept: CARDIOLOGY | Facility: CLINIC | Age: 74
End: 2019-06-19

## 2019-06-19 VITALS
HEART RATE: 82 BPM | DIASTOLIC BLOOD PRESSURE: 76 MMHG | HEIGHT: 63 IN | BODY MASS INDEX: 36.85 KG/M2 | SYSTOLIC BLOOD PRESSURE: 108 MMHG

## 2019-06-19 DIAGNOSIS — E66.01 SEVERE OBESITY (BMI 35.0-39.9) WITH COMORBIDITY (HCC): ICD-10-CM

## 2019-06-19 DIAGNOSIS — M54.50 CHRONIC BILATERAL LOW BACK PAIN WITHOUT SCIATICA: ICD-10-CM

## 2019-06-19 DIAGNOSIS — G89.29 CHRONIC BILATERAL LOW BACK PAIN WITHOUT SCIATICA: ICD-10-CM

## 2019-06-19 DIAGNOSIS — G60.9 IDIOPATHIC PERIPHERAL NEUROPATHY: ICD-10-CM

## 2019-06-19 DIAGNOSIS — R06.02 SHORTNESS OF BREATH: ICD-10-CM

## 2019-06-19 DIAGNOSIS — I49.3 PVC (PREMATURE VENTRICULAR CONTRACTION): Primary | ICD-10-CM

## 2019-06-19 DIAGNOSIS — R00.2 PALPITATIONS: ICD-10-CM

## 2019-06-19 DIAGNOSIS — E78.2 MIXED HYPERLIPIDEMIA: ICD-10-CM

## 2019-06-19 DIAGNOSIS — I10 ESSENTIAL HYPERTENSION: ICD-10-CM

## 2019-06-19 PROCEDURE — 99213 OFFICE O/P EST LOW 20 MIN: CPT | Performed by: NURSE PRACTITIONER

## 2019-06-19 PROCEDURE — 93000 ELECTROCARDIOGRAM COMPLETE: CPT | Performed by: NURSE PRACTITIONER

## 2019-06-19 RX ORDER — BUPROPION HYDROCHLORIDE 100 MG/1
200 TABLET, EXTENDED RELEASE ORAL DAILY
COMMUNITY

## 2019-06-19 RX ORDER — DILTIAZEM HYDROCHLORIDE 180 MG/1
180 CAPSULE, COATED, EXTENDED RELEASE ORAL DAILY
COMMUNITY

## 2019-06-19 RX ORDER — OXYBUTYNIN CHLORIDE 5 MG/1
10 TABLET ORAL DAILY
COMMUNITY

## 2020-08-13 ENCOUNTER — TELEPHONE (OUTPATIENT)
Dept: CARDIOLOGY | Facility: CLINIC | Age: 75
End: 2020-08-13

## 2020-08-13 NOTE — TELEPHONE ENCOUNTER
Received fax from Dr. Carlos Arreola for cardiac clearance for patient to have a pain pump placed. According to our records, I do not see where patient is on any blood thinners or has had any stenting. Patient's last office visit was on 06/19/19.      Fax [phone]